# Patient Record
Sex: MALE | Race: WHITE | URBAN - METROPOLITAN AREA
[De-identification: names, ages, dates, MRNs, and addresses within clinical notes are randomized per-mention and may not be internally consistent; named-entity substitution may affect disease eponyms.]

---

## 2017-05-30 LAB
CHOLESTEROL, TOTAL: 193 MG/DL
CHOLESTEROL/HDL RATIO: NORMAL
GLUCOSE FASTING: 84 MG/DL
HDLC SERPL-MCNC: 61 MG/DL (ref 35–70)
LDL CHOLESTEROL CALCULATED: 119 MG/DL (ref 0–160)
TRIGL SERPL-MCNC: 65 MG/DL
TSH SERPL DL<=0.05 MIU/L-ACNC: NORMAL UIU/ML
VLDLC SERPL CALC-MCNC: NORMAL MG/DL

## 2018-04-05 ENCOUNTER — HOSPITAL ENCOUNTER (OUTPATIENT)
Dept: ENDOSCOPY | Age: 58
Discharge: HOME | End: 2018-04-05
Attending: INTERNAL MEDICINE | Admitting: INTERNAL MEDICINE

## 2018-06-05 VITALS
BODY MASS INDEX: 24.75 KG/M2 | HEART RATE: 58 BPM | WEIGHT: 154 LBS | DIASTOLIC BLOOD PRESSURE: 68 MMHG | RESPIRATION RATE: 16 BRPM | TEMPERATURE: 97.6 F | SYSTOLIC BLOOD PRESSURE: 116 MMHG | HEIGHT: 66 IN

## 2023-08-09 SDOH — HEALTH STABILITY: PHYSICAL HEALTH: ON AVERAGE, HOW MANY DAYS PER WEEK DO YOU ENGAGE IN MODERATE TO STRENUOUS EXERCISE (LIKE A BRISK WALK)?: 4 DAYS

## 2023-08-09 SDOH — HEALTH STABILITY: PHYSICAL HEALTH: ON AVERAGE, HOW MANY MINUTES DO YOU ENGAGE IN EXERCISE AT THIS LEVEL?: 30 MIN

## 2023-08-11 ENCOUNTER — OFFICE VISIT (OUTPATIENT)
Dept: INTERNAL MEDICINE CLINIC | Age: 63
End: 2023-08-11

## 2023-08-11 VITALS
WEIGHT: 153 LBS | BODY MASS INDEX: 24.59 KG/M2 | SYSTOLIC BLOOD PRESSURE: 116 MMHG | OXYGEN SATURATION: 98 % | HEIGHT: 66 IN | HEART RATE: 58 BPM | DIASTOLIC BLOOD PRESSURE: 78 MMHG

## 2023-08-11 DIAGNOSIS — Z76.89 ENCOUNTER TO ESTABLISH CARE: Primary | ICD-10-CM

## 2023-08-11 DIAGNOSIS — D18.01 CHERRY HEMANGIOMA: ICD-10-CM

## 2023-08-11 DIAGNOSIS — I25.10 CORONARY ARTERY DISEASE INVOLVING NATIVE HEART WITHOUT ANGINA PECTORIS, UNSPECIFIED VESSEL OR LESION TYPE: ICD-10-CM

## 2023-08-11 DIAGNOSIS — Z76.89 ENCOUNTER TO ESTABLISH CARE: ICD-10-CM

## 2023-08-11 DIAGNOSIS — Z12.12 ENCOUNTER FOR COLORECTAL CANCER SCREENING: ICD-10-CM

## 2023-08-11 DIAGNOSIS — Z12.11 ENCOUNTER FOR COLORECTAL CANCER SCREENING: ICD-10-CM

## 2023-08-11 DIAGNOSIS — Z12.83 SKIN CANCER SCREENING: ICD-10-CM

## 2023-08-11 DIAGNOSIS — I25.2 HISTORY OF MI (MYOCARDIAL INFARCTION): ICD-10-CM

## 2023-08-11 LAB
ALBUMIN SERPL-MCNC: 4.5 G/DL (ref 3.4–5)
ALBUMIN/GLOB SERPL: 2.1 {RATIO} (ref 1.1–2.2)
ALP SERPL-CCNC: 60 U/L (ref 40–129)
ALT SERPL-CCNC: 22 U/L (ref 10–40)
ANION GAP SERPL CALCULATED.3IONS-SCNC: 8 MMOL/L (ref 3–16)
AST SERPL-CCNC: 21 U/L (ref 15–37)
BASOPHILS # BLD: 0 K/UL (ref 0–0.2)
BASOPHILS NFR BLD: 1.1 %
BILIRUB SERPL-MCNC: 0.5 MG/DL (ref 0–1)
BUN SERPL-MCNC: 16 MG/DL (ref 7–20)
CALCIUM SERPL-MCNC: 9.6 MG/DL (ref 8.3–10.6)
CHLORIDE SERPL-SCNC: 105 MMOL/L (ref 99–110)
CHOLEST SERPL-MCNC: 200 MG/DL (ref 0–199)
CO2 SERPL-SCNC: 30 MMOL/L (ref 21–32)
CREAT SERPL-MCNC: 0.8 MG/DL (ref 0.8–1.3)
DEPRECATED RDW RBC AUTO: 12.9 % (ref 12.4–15.4)
EOSINOPHIL # BLD: 0.1 K/UL (ref 0–0.6)
EOSINOPHIL NFR BLD: 1.7 %
GFR SERPLBLD CREATININE-BSD FMLA CKD-EPI: >60 ML/MIN/{1.73_M2}
GLUCOSE SERPL-MCNC: 95 MG/DL (ref 70–99)
HCT VFR BLD AUTO: 41.9 % (ref 40.5–52.5)
HDLC SERPL-MCNC: 62 MG/DL (ref 40–60)
HGB BLD-MCNC: 14.5 G/DL (ref 13.5–17.5)
LDL CHOLESTEROL CALCULATED: 127 MG/DL
LYMPHOCYTES # BLD: 1.2 K/UL (ref 1–5.1)
LYMPHOCYTES NFR BLD: 30 %
MCH RBC QN AUTO: 32.3 PG (ref 26–34)
MCHC RBC AUTO-ENTMCNC: 34.5 G/DL (ref 31–36)
MCV RBC AUTO: 93.9 FL (ref 80–100)
MONOCYTES # BLD: 0.3 K/UL (ref 0–1.3)
MONOCYTES NFR BLD: 7.8 %
NEUTROPHILS # BLD: 2.3 K/UL (ref 1.7–7.7)
NEUTROPHILS NFR BLD: 59.4 %
PLATELET # BLD AUTO: 265 K/UL (ref 135–450)
PMV BLD AUTO: 8.9 FL (ref 5–10.5)
POTASSIUM SERPL-SCNC: 4.9 MMOL/L (ref 3.5–5.1)
PROT SERPL-MCNC: 6.6 G/DL (ref 6.4–8.2)
RBC # BLD AUTO: 4.47 M/UL (ref 4.2–5.9)
SODIUM SERPL-SCNC: 143 MMOL/L (ref 136–145)
TRIGL SERPL-MCNC: 57 MG/DL (ref 0–150)
VLDLC SERPL CALC-MCNC: 11 MG/DL
WBC # BLD AUTO: 3.9 K/UL (ref 4–11)

## 2023-08-11 SDOH — ECONOMIC STABILITY: FOOD INSECURITY: WITHIN THE PAST 12 MONTHS, YOU WORRIED THAT YOUR FOOD WOULD RUN OUT BEFORE YOU GOT MONEY TO BUY MORE.: NEVER TRUE

## 2023-08-11 SDOH — ECONOMIC STABILITY: HOUSING INSECURITY
IN THE LAST 12 MONTHS, WAS THERE A TIME WHEN YOU DID NOT HAVE A STEADY PLACE TO SLEEP OR SLEPT IN A SHELTER (INCLUDING NOW)?: NO

## 2023-08-11 SDOH — ECONOMIC STABILITY: INCOME INSECURITY: HOW HARD IS IT FOR YOU TO PAY FOR THE VERY BASICS LIKE FOOD, HOUSING, MEDICAL CARE, AND HEATING?: NOT HARD AT ALL

## 2023-08-11 SDOH — ECONOMIC STABILITY: FOOD INSECURITY: WITHIN THE PAST 12 MONTHS, THE FOOD YOU BOUGHT JUST DIDN'T LAST AND YOU DIDN'T HAVE MONEY TO GET MORE.: NEVER TRUE

## 2023-08-11 ASSESSMENT — PATIENT HEALTH QUESTIONNAIRE - PHQ9
1. LITTLE INTEREST OR PLEASURE IN DOING THINGS: 0
SUM OF ALL RESPONSES TO PHQ9 QUESTIONS 1 & 2: 0
SUM OF ALL RESPONSES TO PHQ QUESTIONS 1-9: 0
2. FEELING DOWN, DEPRESSED OR HOPELESS: 0
SUM OF ALL RESPONSES TO PHQ QUESTIONS 1-9: 0

## 2023-08-11 NOTE — PROGRESS NOTES
Yola Hutchinson (:  1960) is a 63 y.o. male,New patient, here for evaluation of the following chief complaint(s):  New Patient (Pt c/o skin spots on back, family hx of skin Cancer/wants referral to GI/Fasting)    ASSESSMENT/PLAN:  Encounter to establish care  Assessment & Plan:  Physical exam completed today. Fasting labs ordered. Reviewed care gaps. Due for colonoscopy, referral placed. Orders:  -     Lipid, Fasting; Future  -     CBC with Auto Differential; Future  -     Comprehensive Metabolic Panel; Future  -     Hemoglobin A1C; Future  Coronary artery disease involving native heart without angina pectoris, unspecified vessel or lesion type/ History of MI (myocardial infarction)  Assessment & Plan:  Last saw Dr Christian Slaughter in  (I can't see notes from this visit). Visit in  noted:  Repeated stress test, f/u after GTX in  with suspicious changes with ST segment depression. In , he had a cath that showed mild to moderate LAD Disease with some haziness. Was on zocor 40 mg daily and ASA 81 mg daily. Recommended continued semiannual lipid checks LDL goal in the range of 70 or less. Check lipid panel today, follow above recommendation for LDL goal.   Referral to cardiology as patient hasn't been seen since . Denies chest pains, palpitations, shortness of breath, JENSEN. Orders:  -     Lipid, Fasting; Future  -     CBC with Auto Differential; Future  -     Comprehensive Metabolic Panel; Future  -     Hemoglobin A1C; Future  -     Winston Travis MD, Cardiology, [facility]  Cherry hemangioma/ Skin cancer screening  Assessment & Plan:   Area of patient's concern consistent with cherry hemangioma. Unchanged over time. Given patients family history of skin cancer, he'd like a referral to dermatology for routine skin exams. Order placed.    -     Sharon Huerta MD, Dermatology, [facility]  Encounter for colorectal cancer screening  Assessment & Plan:   Per

## 2023-08-12 PROBLEM — D18.01 CHERRY HEMANGIOMA: Status: ACTIVE | Noted: 2023-08-12

## 2023-08-12 PROBLEM — Z76.89 ENCOUNTER TO ESTABLISH CARE: Status: ACTIVE | Noted: 2023-08-12

## 2023-08-12 LAB
EST. AVERAGE GLUCOSE BLD GHB EST-MCNC: 105.4 MG/DL
HBA1C MFR BLD: 5.3 %

## 2023-08-12 ASSESSMENT — ENCOUNTER SYMPTOMS
CHEST TIGHTNESS: 0
SHORTNESS OF BREATH: 0

## 2023-08-12 NOTE — ASSESSMENT & PLAN NOTE
Physical exam completed today. Fasting labs ordered. Reviewed care gaps. Due for colonoscopy, referral placed.

## 2023-08-12 NOTE — ASSESSMENT & PLAN NOTE
Area of patient's concern consistent with cherry hemangioma. Unchanged over time. Given patients family history of skin cancer, he'd like a referral to dermatology for routine skin exams. Order placed.

## 2023-08-12 NOTE — ASSESSMENT & PLAN NOTE
Last saw Dr Manas Mcgill in 2015 (I can't see notes from this visit). Visit in 2013 noted:  Repeated stress test, f/u after GTX in 2011 with suspicious changes with ST segment depression. In 1998, he had a cath that showed mild to moderate LAD Disease with some haziness. Was on zocor 40 mg daily and ASA 81 mg daily. Recommended continued semiannual lipid checks LDL goal in the range of 70 or less. Check lipid panel today, follow above recommendation for LDL goal.   Referral to cardiology as patient hasn't been seen since 2015. Denies chest pains, palpitations, shortness of breath, JENSEN.

## 2023-08-13 DIAGNOSIS — I25.10 CORONARY ARTERY DISEASE INVOLVING NATIVE HEART WITHOUT ANGINA PECTORIS, UNSPECIFIED VESSEL OR LESION TYPE: Primary | ICD-10-CM

## 2023-08-13 DIAGNOSIS — E78.2 MIXED HYPERLIPIDEMIA: ICD-10-CM

## 2023-08-13 RX ORDER — SIMVASTATIN 20 MG
20 TABLET ORAL NIGHTLY
Qty: 30 TABLET | Refills: 0 | Status: SHIPPED | OUTPATIENT
Start: 2023-08-13

## 2023-08-15 ENCOUNTER — TELEPHONE (OUTPATIENT)
Dept: CARDIOLOGY CLINIC | Age: 63
End: 2023-08-15

## 2023-08-15 NOTE — TELEPHONE ENCOUNTER
New Patient Referral    Referring Provider Name: SHANA Deleon CNP   Phone Number:  Fax Number: [phone]  Address: [address] [place] [address] Internal Medicine  [address] [place] [address]    Diagnosis/Reason for Visit: Coronary artery disease involving native heart without angina pectoris,unspecified vessel or lesion type, History of Myocardial Infarction    Cardiac Clearance? unsure    Cardiac Testing: (Yes/No/Unsure) unsure    Date testing was completed?___________      Have records been requested? (Yes/No) unsure    Preferred Language:_English______________    needed? (Yes/No) no      Phoned patient lvm to cb and schedule new patient appointment.

## 2023-08-18 NOTE — TELEPHONE ENCOUNTER
Patient was referred by Luke SALDANA CNP to the [facility] location with Dr. Kirill Sam. Patient has been scheduled for 09/07/23 at 2:00 (am/pm). Patient verbalized understanding of appointment instructions. Call complete.

## 2023-08-29 NOTE — PROGRESS NOTES
no past surgical history on file. SocHx:  reports that he has never smoked. He has never used smokeless tobacco. He reports that he does not drink alcohol and does not use drugs. FamHx: No family history of premature coronary artery disease, sudden death, or aneurysm  Family History   Problem Relation Age of Onset    Cancer Mother         Ovarian Cancer in remission age 80    Prostate Cancer Brother         radioactive seed therapy     Allergies: Iodine and Shellfish-derived products     MEDICATIONS      Prior to Admission medications    Medication Sig Start Date End Date Taking? Authorizing Provider   simvastatin (ZOCOR) 20 MG tablet Take 1 tablet by mouth nightly 8/13/23  Yes SHANA Suarez CNP   aspirin 81 MG tablet Take 81 mg by mouth daily  Patient not taking: Reported on 8/11/2023    Historical Provider, MD     PHYSICAL EXAM        Vitals:    09/07/23 1410   BP: 126/78   Pulse: 67   SpO2: 98%    Weight - Scale: 151 lb 12.8 oz (68.9 kg)     Gen Alert, cooperative, no distress Heart  Regular rate and rhythm, no murmur. Normal S1 and S2. Warm/well perfused. No JVD. No carotid bruit. HEENT Normocephalic, atraumatic.   Conj/corn clear  Lips, mucosa, tongue normal Abd  Soft, nontender, nondistended, no organomegaly   Neck Supple, trachea midline Ext  Ext nl, AT, no C/C, no edema   Lungs Lungs clear to auscultation bilaterally, unlabored breathing Pulse 2+ and symmetric   Chest wall No deformity Skin Color/text/turg nl, no rash/lesions   Neuro No gross deficits Psych Nl mood and affect     LABS and Imaging     Relevant and available CV data reviewed    EKG personally interpreted: 9/7/23:   Sinus  Bradycardia   WITHIN NORMAL LIMITS    Lab Results   Component Value Date    CHOL 193 05/30/2017    CHOLFAST 200 (H) 08/11/2023    TRIG 65 05/30/2017    TRIGLYCFAST 57 08/11/2023    HDL 62 (H) 08/11/2023    LDLCALC 127 (H) 08/11/2023    LABVLDL 11 08/11/2023     The 10-year ASCVD risk score (Magda YEUNG, et al.,

## 2023-09-07 ENCOUNTER — OFFICE VISIT (OUTPATIENT)
Dept: CARDIOLOGY CLINIC | Age: 63
End: 2023-09-07
Payer: COMMERCIAL

## 2023-09-07 VITALS
OXYGEN SATURATION: 98 % | SYSTOLIC BLOOD PRESSURE: 126 MMHG | BODY MASS INDEX: 24.4 KG/M2 | HEART RATE: 67 BPM | DIASTOLIC BLOOD PRESSURE: 78 MMHG | WEIGHT: 151.8 LBS | HEIGHT: 66 IN

## 2023-09-07 DIAGNOSIS — E78.5 HYPERLIPIDEMIA, UNSPECIFIED HYPERLIPIDEMIA TYPE: ICD-10-CM

## 2023-09-07 DIAGNOSIS — I25.2 HISTORY OF MI (MYOCARDIAL INFARCTION): ICD-10-CM

## 2023-09-07 DIAGNOSIS — I25.10 CORONARY ARTERY DISEASE INVOLVING NATIVE HEART WITHOUT ANGINA PECTORIS, UNSPECIFIED VESSEL OR LESION TYPE: Primary | ICD-10-CM

## 2023-09-07 PROCEDURE — 99203 OFFICE O/P NEW LOW 30 MIN: CPT | Performed by: INTERNAL MEDICINE

## 2023-09-07 PROCEDURE — 93000 ELECTROCARDIOGRAM COMPLETE: CPT | Performed by: INTERNAL MEDICINE

## 2023-09-07 NOTE — PATIENT INSTRUCTIONS
Follow up with Dr Hannah Paredes in 1 year     Calcium score to evaluate need for statin - [facility] can do this for $ out of pocket. Call for any questions or concerns.

## 2023-09-12 DIAGNOSIS — I25.10 CORONARY ARTERY DISEASE INVOLVING NATIVE HEART WITHOUT ANGINA PECTORIS, UNSPECIFIED VESSEL OR LESION TYPE: ICD-10-CM

## 2023-09-12 DIAGNOSIS — E78.2 MIXED HYPERLIPIDEMIA: ICD-10-CM

## 2023-09-15 ENCOUNTER — TELEPHONE (OUTPATIENT)
Dept: INTERNAL MEDICINE CLINIC | Age: 63
End: 2023-09-15

## 2023-09-15 RX ORDER — SIMVASTATIN 20 MG
20 TABLET ORAL
Qty: 30 TABLET | Refills: 0 | Status: SHIPPED | OUTPATIENT
Start: 2023-09-15

## 2023-09-15 NOTE — TELEPHONE ENCOUNTER
Patient called today, voice message left to calll this office back to schedule a follow up visit with Shawanda per medication (Simvastatin 20mg). Please inform patient that I have approved a refill on the simvastatin, but he needs to schedule a follow-up appointment as we started this medication 1 month ago.

## 2023-11-03 ENCOUNTER — OFFICE VISIT (OUTPATIENT)
Dept: INTERNAL MEDICINE CLINIC | Age: 63
End: 2023-11-03
Payer: COMMERCIAL

## 2023-11-03 VITALS
WEIGHT: 149.8 LBS | OXYGEN SATURATION: 98 % | SYSTOLIC BLOOD PRESSURE: 116 MMHG | HEART RATE: 60 BPM | DIASTOLIC BLOOD PRESSURE: 74 MMHG | BODY MASS INDEX: 24.08 KG/M2 | HEIGHT: 66 IN

## 2023-11-03 DIAGNOSIS — Z23 NEED FOR INFLUENZA VACCINATION: ICD-10-CM

## 2023-11-03 DIAGNOSIS — E78.5 HYPERLIPIDEMIA, UNSPECIFIED HYPERLIPIDEMIA TYPE: ICD-10-CM

## 2023-11-03 DIAGNOSIS — Z12.83 SKIN CANCER SCREENING: ICD-10-CM

## 2023-11-03 DIAGNOSIS — D18.01 CHERRY HEMANGIOMA: Primary | ICD-10-CM

## 2023-11-03 PROCEDURE — 90471 IMMUNIZATION ADMIN: CPT

## 2023-11-03 PROCEDURE — 99212 OFFICE O/P EST SF 10 MIN: CPT

## 2023-11-03 PROCEDURE — 90674 CCIIV4 VAC NO PRSV 0.5 ML IM: CPT

## 2023-11-03 NOTE — PROGRESS NOTES
Jose Coulter (:  1960) is a 66 y.o. male,Established patient, here for evaluation of the following chief complaint(s):  Follow-up (Pt ans Derm referral/)    ASSESSMENT/PLAN:  1. [place] hemangioma  Assessment & Plan:  Lesions are unchanged. No friability. Given external referral to dermatology given family history of skin cancer. Instructed him to call his insurance company to see which local dermatologists they will cover/which are accepting new patients. Orders:  -     External Referral To Dermatology  2. Skin cancer screening  -     External Referral To Dermatology  3. Need for influenza vaccination  -     Influenza, FLUCELVAX, (age 10 mo+), IM, Preservative Free, 0.5 mL  4. Hyperlipidemia, unspecified hyperlipidemia type  Assessment & Plan:  Recent visit with Dr Darya Zaragoza. After discussion he stopped taking his simvastatin. He hasn't gotten his calcium score done at [facility] yet. Has follow-up with Dr Darya Zaragoza in 1 year. Continue healthy lifestyle. Return in about 9 months (around 2024), or if symptoms worsen or fail to improve, for physical exam.    SUBJECTIVE/OBJECTIVE:  HPI  66y.o. year old male here for a dermatology referral.     Patient was noted to have skin concern at initial visit with me, exam consistent with cherry angioma which were unchanged over times. Patient has a history of skin cancer and requested a dermatology referral.   Today he reports the dermatologist I referred to isn't accepting new patients. Of note, he recently saw Dr Barak Courtney and after discussion stopped his simvastatin. Although notes from 23 visit state no medication changes. Was given information for [facility] to get calcium score to determine LDL goal.     Has appointment with Dr Ivory Alexandra office  for routine. Current Outpatient Medications   Medication Sig Dispense Refill    aspirin 81 MG tablet Take 1 tablet by mouth daily       No current facility-administered medications for this visit.

## 2023-11-03 NOTE — ASSESSMENT & PLAN NOTE
Recent visit with Dr Dianne Marquez. After discussion he stopped taking his simvastatin. He hasn't gotten his calcium score done at [facility] yet. Has follow-up with Dr Dianne Marquez in 1 year. Continue healthy lifestyle.

## 2023-11-03 NOTE — ASSESSMENT & PLAN NOTE
Lesions are unchanged. No friability. Given external referral to dermatology given family history of skin cancer. Instructed him to call his insurance company to see which local dermatologists they will cover/which are accepting new patients.

## 2024-06-28 ENCOUNTER — TELEPHONE (OUTPATIENT)
Dept: INTERNAL MEDICINE CLINIC | Age: 64
End: 2024-06-28

## 2024-09-17 ASSESSMENT — PATIENT HEALTH QUESTIONNAIRE - PHQ9
SUM OF ALL RESPONSES TO PHQ9 QUESTIONS 1 & 2: 0
SUM OF ALL RESPONSES TO PHQ QUESTIONS 1-9: 0
1. LITTLE INTEREST OR PLEASURE IN DOING THINGS: NOT AT ALL
2. FEELING DOWN, DEPRESSED OR HOPELESS: NOT AT ALL
1. LITTLE INTEREST OR PLEASURE IN DOING THINGS: NOT AT ALL
SUM OF ALL RESPONSES TO PHQ QUESTIONS 1-9: 0
SUM OF ALL RESPONSES TO PHQ QUESTIONS 1-9: 0
2. FEELING DOWN, DEPRESSED OR HOPELESS: NOT AT ALL
SUM OF ALL RESPONSES TO PHQ QUESTIONS 1-9: 0
SUM OF ALL RESPONSES TO PHQ9 QUESTIONS 1 & 2: 0

## 2024-09-20 ENCOUNTER — OFFICE VISIT (OUTPATIENT)
Dept: INTERNAL MEDICINE CLINIC | Age: 64
End: 2024-09-20

## 2024-09-20 VITALS
OXYGEN SATURATION: 95 % | BODY MASS INDEX: 25.91 KG/M2 | SYSTOLIC BLOOD PRESSURE: 122 MMHG | HEART RATE: 66 BPM | HEIGHT: 66 IN | WEIGHT: 161.2 LBS | DIASTOLIC BLOOD PRESSURE: 76 MMHG

## 2024-09-20 DIAGNOSIS — L98.9 SKIN LESION OF BACK: ICD-10-CM

## 2024-09-20 DIAGNOSIS — E78.2 MIXED HYPERLIPIDEMIA: ICD-10-CM

## 2024-09-20 DIAGNOSIS — Z00.00 ANNUAL PHYSICAL EXAM: Primary | ICD-10-CM

## 2024-09-20 DIAGNOSIS — I25.10 CORONARY ARTERY DISEASE INVOLVING NATIVE CORONARY ARTERY OF NATIVE HEART WITHOUT ANGINA PECTORIS: ICD-10-CM

## 2024-09-20 PROBLEM — Z76.89 ENCOUNTER TO ESTABLISH CARE: Status: RESOLVED | Noted: 2023-08-12 | Resolved: 2024-09-20

## 2024-09-20 PROBLEM — H52.4 PRESBYOPIA: Status: ACTIVE | Noted: 2023-09-07

## 2024-09-20 SDOH — ECONOMIC STABILITY: FOOD INSECURITY: WITHIN THE PAST 12 MONTHS, THE FOOD YOU BOUGHT JUST DIDN'T LAST AND YOU DIDN'T HAVE MONEY TO GET MORE.: NEVER TRUE

## 2024-09-20 SDOH — ECONOMIC STABILITY: FOOD INSECURITY: WITHIN THE PAST 12 MONTHS, YOU WORRIED THAT YOUR FOOD WOULD RUN OUT BEFORE YOU GOT MONEY TO BUY MORE.: NEVER TRUE

## 2024-09-20 SDOH — ECONOMIC STABILITY: INCOME INSECURITY: HOW HARD IS IT FOR YOU TO PAY FOR THE VERY BASICS LIKE FOOD, HOUSING, MEDICAL CARE, AND HEATING?: NOT HARD AT ALL

## 2024-09-20 ASSESSMENT — ENCOUNTER SYMPTOMS
CHEST TIGHTNESS: 0
EYE DISCHARGE: 0
SINUS PAIN: 0
NAUSEA: 0
SORE THROAT: 0
BACK PAIN: 0
CONSTIPATION: 0
SHORTNESS OF BREATH: 0
WHEEZING: 0
TROUBLE SWALLOWING: 0
COLOR CHANGE: 0
ABDOMINAL PAIN: 0
VOMITING: 0
SINUS PRESSURE: 0